# Patient Record
Sex: FEMALE | Race: WHITE | Employment: FULL TIME | URBAN - METROPOLITAN AREA
[De-identification: names, ages, dates, MRNs, and addresses within clinical notes are randomized per-mention and may not be internally consistent; named-entity substitution may affect disease eponyms.]

---

## 2020-08-13 ENCOUNTER — DOCUMENTATION ONLY (OUTPATIENT)
Dept: CASE MANAGEMENT | Age: 55
End: 2020-08-13

## 2020-08-13 NOTE — PROGRESS NOTES
I spoke with Dr [place] office in reference to obtaining a surgical clearance letter for patient to have surgery on 8/18/20. They are aware patient has completed her stress test. The assistant will get Dr Sandra Caballero the message in order for us to get the letter tomorrow. I gave her my contact number and fax number of [phone].

## 2020-08-17 ENCOUNTER — DOCUMENTATION ONLY (OUTPATIENT)
Dept: CASE MANAGEMENT | Age: 55
End: 2020-08-17

## 2020-08-17 ENCOUNTER — ANESTHESIA EVENT (OUTPATIENT)
Dept: SURGERY | Age: 55
End: 2020-08-17
Payer: COMMERCIAL

## 2020-08-17 ENCOUNTER — HOSPITAL ENCOUNTER (OUTPATIENT)
Dept: SURGERY | Age: 55
Discharge: HOME | End: 2020-08-17
Payer: COMMERCIAL

## 2020-08-17 VITALS
OXYGEN SATURATION: 99 % | DIASTOLIC BLOOD PRESSURE: 41 MMHG | RESPIRATION RATE: 20 BRPM | HEART RATE: 75 BPM | BODY MASS INDEX: 48.14 KG/M2 | SYSTOLIC BLOOD PRESSURE: 114 MMHG | WEIGHT: 282 LBS | TEMPERATURE: 97.7 F | HEIGHT: 64 IN

## 2020-08-17 LAB
ANION GAP SERPL CALC-SCNC: 4 MMOL/L (ref 7–16)
BASOPHILS # BLD: 0 K/UL (ref 0–0.2)
BASOPHILS NFR BLD: 0 % (ref 0–2)
BUN SERPL-MCNC: 19 MG/DL (ref 6–23)
CALCIUM SERPL-MCNC: 8.9 MG/DL (ref 8.3–10.4)
CHLORIDE SERPL-SCNC: 106 MMOL/L (ref 98–107)
CO2 SERPL-SCNC: 30 MMOL/L (ref 21–32)
CREAT SERPL-MCNC: 0.93 MG/DL (ref 0.6–1)
DIFFERENTIAL METHOD BLD: ABNORMAL
EOSINOPHIL # BLD: 0.1 K/UL (ref 0–0.8)
EOSINOPHIL NFR BLD: 1 % (ref 0.5–7.8)
ERYTHROCYTE [DISTWIDTH] IN BLOOD BY AUTOMATED COUNT: 14.6 % (ref 11.9–14.6)
GLUCOSE SERPL-MCNC: 90 MG/DL (ref 65–100)
HCT VFR BLD AUTO: 31.2 % (ref 35.8–46.3)
HGB BLD-MCNC: 8.8 G/DL (ref 11.7–15.4)
IMM GRANULOCYTES # BLD AUTO: 0 K/UL (ref 0–0.5)
IMM GRANULOCYTES NFR BLD AUTO: 0 % (ref 0–5)
LYMPHOCYTES # BLD: 2.7 K/UL (ref 0.5–4.6)
LYMPHOCYTES NFR BLD: 28 % (ref 13–44)
MCH RBC QN AUTO: 21.9 PG (ref 26.1–32.9)
MCHC RBC AUTO-ENTMCNC: 28.2 G/DL (ref 31.4–35)
MCV RBC AUTO: 77.6 FL (ref 79.6–97.8)
MONOCYTES # BLD: 0.5 K/UL (ref 0.1–1.3)
MONOCYTES NFR BLD: 5 % (ref 4–12)
NEUTS SEG # BLD: 6.5 K/UL (ref 1.7–8.2)
NEUTS SEG NFR BLD: 66 % (ref 43–78)
NRBC # BLD: 0 K/UL (ref 0–0.2)
PLATELET # BLD AUTO: 302 K/UL (ref 150–450)
PMV BLD AUTO: 10.8 FL (ref 9.4–12.3)
POTASSIUM SERPL-SCNC: 4.8 MMOL/L (ref 3.5–5.1)
RBC # BLD AUTO: 4.02 M/UL (ref 4.05–5.2)
SODIUM SERPL-SCNC: 140 MMOL/L (ref 136–145)
WBC # BLD AUTO: 9.8 K/UL (ref 4.3–11.1)

## 2020-08-17 PROCEDURE — 86900 BLOOD TYPING SEROLOGIC ABO: CPT

## 2020-08-17 PROCEDURE — 85025 COMPLETE CBC W/AUTO DIFF WBC: CPT

## 2020-08-17 PROCEDURE — 80048 BASIC METABOLIC PNL TOTAL CA: CPT

## 2020-08-17 RX ORDER — SODIUM CHLORIDE 9 MG/ML
250 INJECTION, SOLUTION INTRAVENOUS AS NEEDED
Status: DISCONTINUED | OUTPATIENT
Start: 2020-08-17 | End: 2020-08-21 | Stop reason: HOSPADM

## 2020-08-17 NOTE — PERIOP NOTES
Patient verified name and     Order for consent WAS found in EHR and matches case posting; patient verified. Type 2 surgery, WALK IN assessment complete. Labs per surgeon: CBC, BMP, TYPE ANS SCREEN-- COLLECED TODAY AND SENT TO LAB  Labs per anesthesia protocol: HCG DOS  EK20, STRESS 20, CATH 20-- ALSO NOTE OKING SURG FROM DR Irvin Lemus ON CATH REPORT IN CARE EVERYWHERE    A negative Covid swab result is required to proceed with surgery; The testing center is located at the [address], [place]. An appointment is required therefore the patient will be contacted by the Covid swab team. The testing clinic is closed from  for lunch and on weekends. For questions or concerns the patient should call [phone]. Appointment date/time 20--NEG-- RESULTS IN CC found in EHR and provided to patient. Hospital approved surgical skin cleanser and instructions given per hospital policy. Patient provided with and instructed on educational handouts including Guide to Surgery, Pain Management, Hand Hygiene, Blood Transfusion Education, and [facility] Brochure. Patient answered medical/surgical history questions at their best of ability. All prior to admission medications documented in [facility]. Original medication prescription bottle WAS visualized during patient appointment. Patient instructed to hold all vitamins 7 days prior to surgery and NSAIDS 5 days prior to surgery, patient verbalized understanding. Patient teach back successful and patient demonstrates knowledge of instructions.

## 2020-08-17 NOTE — PROGRESS NOTES
I spoke to Dr Cummins Led office in reference to cardiac clearance for surgery tomorrow. I received a fax from Cardiology granting the clearance for surgery. Orders received from Dr Oliva Georges for a BMP today. I called patient and she will have done during pre-op today at the  OP lab. Order faxed to [facility] [phone] confirmation page received.

## 2020-08-17 NOTE — PERIOP NOTES
REVIEWED TODAYS LABS-- NOTED HGB 8.8-- CALLED OFFICE -- SPOKE Kate Worthy--- DR Torrey Benítez IN UNIT-- ORDERED TYPE AND CROSS 2 UNITS AND HOLD--- CALLED LAB-- SPOKE TO HETAL-- STATES JUST PUT ORDER IN AND THEY CAN USE BLOOD THAT WAS DRAWN AT [facility]  TODAY-- ORDER PLACED

## 2020-08-17 NOTE — PERIOP NOTES
Recent Results (from the past 12 hour(s))   TYPE & SCREEN    Collection Time: 08/17/20 10:54 AM   Result Value Ref Range    Crossmatch Expiration 08/20/2020     ABO/Rh(D) O NEGATIVE     Antibody screen NEG    METABOLIC PANEL, BASIC    Collection Time: 08/17/20 11:03 AM   Result Value Ref Range    Sodium 140 136 - 145 mmol/L    Potassium 4.8 3.5 - 5.1 mmol/L    Chloride 106 98 - 107 mmol/L    CO2 30 21 - 32 mmol/L    Anion gap 4 (L) 7 - 16 mmol/L    Glucose 90 65 - 100 mg/dL    BUN 19 6 - 23 MG/DL    Creatinine 0.93 0.6 - 1.0 MG/DL    GFR est AA >60 >60 ml/min/1.73m2    GFR est non-AA >60 >60 ml/min/1.73m2    Calcium 8.9 8.3 - 10.4 MG/DL   CBC WITH AUTOMATED DIFF    Collection Time: 08/17/20 11:03 AM   Result Value Ref Range    WBC 9.8 4.3 - 11.1 K/uL    RBC 4.02 (L) 4.05 - 5.2 M/uL    HGB 8.8 (L) 11.7 - 15.4 g/dL    HCT 31.2 (L) 35.8 - 46.3 %    MCV 77.6 (L) 79.6 - 97.8 FL    MCH 21.9 (L) 26.1 - 32.9 PG    MCHC 28.2 (L) 31.4 - 35.0 g/dL    RDW 14.6 11.9 - 14.6 %    PLATELET 271 039 - 599 K/uL    MPV 10.8 9.4 - 12.3 FL    ABSOLUTE NRBC 0.00 0.0 - 0.2 K/uL    DF AUTOMATED      NEUTROPHILS 66 43 - 78 %    LYMPHOCYTES 28 13 - 44 %    MONOCYTES 5 4.0 - 12.0 %    EOSINOPHILS 1 0.5 - 7.8 %    BASOPHILS 0 0.0 - 2.0 %    IMMATURE GRANULOCYTES 0 0.0 - 5.0 %    ABS. NEUTROPHILS 6.5 1.7 - 8.2 K/UL    ABS. LYMPHOCYTES 2.7 0.5 - 4.6 K/UL    ABS. MONOCYTES 0.5 0.1 - 1.3 K/UL    ABS. EOSINOPHILS 0.1 0.0 - 0.8 K/UL    ABS. BASOPHILS 0.0 0.0 - 0.2 K/UL    ABS. IMM.  GRANS. 0.0 0.0 - 0.5 K/UL   PLEASE NOTE HGB AT [facility] VISIT TODAY

## 2020-08-17 NOTE — PERIOP NOTES
PLEASE CONTINUE TAKING ALL PRESCRIPTION MEDICATIONS UP TO THE DAY OF SURGERY UNLESS OTHERWISE DIRECTED BELOW. DISCONTINUE all vitamins and supplements 7 days prior to surgery. DISCONTINUE Non-Steriodal Anti-Inflammatory (NSAIDS) such as Advil and Aleve 5 days prior to surgery. Home Medications to take  the day of surgery WITH SIP OF WATER    ASA 81 MG, ATENOLOL, PROZAC, OMEPRAZOLE-- AND IF NEEDED ATIVAN AND NORCO           Home Medications   to Hold   MOTRIN        Comments          *Visitor policy of 1 visitor per patient discussed. Please do not bring home medications with you on the day of surgery unless otherwise directed by your nurse. If you are instructed to bring home medications, please give them to your nurse as they will be administered by the nursing staff. If you have any questions, please call [address] [place] [address] [phone] or [address] [place] [address] [phone]. A copy of this note was provided to the patient for reference.

## 2020-08-18 ENCOUNTER — ANESTHESIA (OUTPATIENT)
Dept: SURGERY | Age: 55
End: 2020-08-18
Payer: COMMERCIAL

## 2020-08-18 ENCOUNTER — HOSPITAL ENCOUNTER (OUTPATIENT)
Age: 55
Setting detail: OBSERVATION
Discharge: HOME | End: 2020-08-19
Attending: OBSTETRICS & GYNECOLOGY | Admitting: OBSTETRICS & GYNECOLOGY
Payer: COMMERCIAL

## 2020-08-18 DIAGNOSIS — R52 PAIN: Primary | ICD-10-CM

## 2020-08-18 PROBLEM — Z90.710 S/P TOTAL ABDOMINAL HYSTERECTOMY: Status: ACTIVE | Noted: 2020-08-18

## 2020-08-18 LAB
ABO + RH BLD: NORMAL
BLOOD GROUP ANTIBODIES SERPL: NORMAL
HCG UR QL: NEGATIVE
HCT VFR BLD AUTO: 31.4 % (ref 35.8–46.3)
HGB BLD-MCNC: 8.9 G/DL (ref 11.7–15.4)
SPECIMEN EXP DATE BLD: NORMAL

## 2020-08-18 PROCEDURE — 77030027138 HC INCENT SPIROMETER -A

## 2020-08-18 PROCEDURE — 74011250637 HC RX REV CODE- 250/637: Performed by: ANESTHESIOLOGY

## 2020-08-18 PROCEDURE — 77030019908 HC STETH ESOPH SIMS -A: Performed by: REGISTERED NURSE

## 2020-08-18 PROCEDURE — 77030010507 HC ADH SKN DERMBND J&J -B: Performed by: OBSTETRICS & GYNECOLOGY

## 2020-08-18 PROCEDURE — 74011250636 HC RX REV CODE- 250/636: Performed by: REGISTERED NURSE

## 2020-08-18 PROCEDURE — 74011000254 HC RX REV CODE- 254: Performed by: OBSTETRICS & GYNECOLOGY

## 2020-08-18 PROCEDURE — 77030039425 HC BLD LARYNG TRULITE DISP TELE -A: Performed by: REGISTERED NURSE

## 2020-08-18 PROCEDURE — 74011250636 HC RX REV CODE- 250/636: Performed by: OBSTETRICS & GYNECOLOGY

## 2020-08-18 PROCEDURE — 77030008756 HC TU IRR SUC STRY -B: Performed by: OBSTETRICS & GYNECOLOGY

## 2020-08-18 PROCEDURE — 77030040361 HC SLV COMPR DVT MDII -B: Performed by: OBSTETRICS & GYNECOLOGY

## 2020-08-18 PROCEDURE — 77030003575 HC NDL INSUF ENDOPTH J&J -B: Performed by: OBSTETRICS & GYNECOLOGY

## 2020-08-18 PROCEDURE — 74011250636 HC RX REV CODE- 250/636: Performed by: ANESTHESIOLOGY

## 2020-08-18 PROCEDURE — 76010000877 HC OR TIME 2.5 TO 3HR INTENSV - TIER 2: Performed by: OBSTETRICS & GYNECOLOGY

## 2020-08-18 PROCEDURE — 85018 HEMOGLOBIN: CPT

## 2020-08-18 PROCEDURE — 77030027743 HC APPL F/HEMSTAT BARD -B: Performed by: OBSTETRICS & GYNECOLOGY

## 2020-08-18 PROCEDURE — 77030008522 HC TBNG INSUF LAPRO STRY -B: Performed by: OBSTETRICS & GYNECOLOGY

## 2020-08-18 PROCEDURE — 74011000250 HC RX REV CODE- 250: Performed by: OBSTETRICS & GYNECOLOGY

## 2020-08-18 PROCEDURE — 77030037088 HC TUBE ENDOTRACH ORAL NSL COVD-A: Performed by: REGISTERED NURSE

## 2020-08-18 PROCEDURE — 88307 TISSUE EXAM BY PATHOLOGIST: CPT

## 2020-08-18 PROCEDURE — 77030039147 HC PWDR HEMSTS SURGICEL JNJ -D: Performed by: OBSTETRICS & GYNECOLOGY

## 2020-08-18 PROCEDURE — 77030040361 HC SLV COMPR DVT MDII -B

## 2020-08-18 PROCEDURE — 99218 HC RM OBSERVATION: CPT

## 2020-08-18 PROCEDURE — 77030035277 HC OBTRTR BLDELSS DISP INTU -B: Performed by: OBSTETRICS & GYNECOLOGY

## 2020-08-18 PROCEDURE — 74011000250 HC RX REV CODE- 250: Performed by: REGISTERED NURSE

## 2020-08-18 PROCEDURE — 77030012770 HC TRCR OPT FX AMR -B: Performed by: OBSTETRICS & GYNECOLOGY

## 2020-08-18 PROCEDURE — 77030008606 HC TRCR ENDOSC KII AMR -B: Performed by: OBSTETRICS & GYNECOLOGY

## 2020-08-18 PROCEDURE — 77030031139 HC SUT VCRL2 J&J -A: Performed by: OBSTETRICS & GYNECOLOGY

## 2020-08-18 PROCEDURE — 81025 URINE PREGNANCY TEST: CPT

## 2020-08-18 PROCEDURE — 77030014008 HC SPNG HEMSTAT J&J -C: Performed by: OBSTETRICS & GYNECOLOGY

## 2020-08-18 PROCEDURE — 77030003580 HC NDL INSUF VERES J&J -B: Performed by: OBSTETRICS & GYNECOLOGY

## 2020-08-18 PROCEDURE — 77030034696 HC CATH URETH FOL 2W BARD -A: Performed by: OBSTETRICS & GYNECOLOGY

## 2020-08-18 PROCEDURE — 77030022704 HC SUT VLOC COVD -B: Performed by: OBSTETRICS & GYNECOLOGY

## 2020-08-18 PROCEDURE — 88112 CYTOPATH CELL ENHANCE TECH: CPT

## 2020-08-18 PROCEDURE — 76210000013 HC REC RM PH I  8.5 TO 9 HR: Performed by: OBSTETRICS & GYNECOLOGY

## 2020-08-18 PROCEDURE — 76060000036 HC ANESTHESIA 2.5 TO 3 HR: Performed by: OBSTETRICS & GYNECOLOGY

## 2020-08-18 PROCEDURE — 77030010545: Performed by: OBSTETRICS & GYNECOLOGY

## 2020-08-18 PROCEDURE — 77030040922 HC BLNKT HYPOTHRM STRY -A: Performed by: REGISTERED NURSE

## 2020-08-18 PROCEDURE — 77030016151 HC PROTCTR LNS DFOG COVD -B: Performed by: OBSTETRICS & GYNECOLOGY

## 2020-08-18 PROCEDURE — 77030005518 HC CATH URETH FOL 2W BARD -B: Performed by: OBSTETRICS & GYNECOLOGY

## 2020-08-18 PROCEDURE — 77030003028 HC SUT VCRL J&J -A: Performed by: OBSTETRICS & GYNECOLOGY

## 2020-08-18 PROCEDURE — 77030020703 HC SEAL CANN DISP INTU -B: Performed by: OBSTETRICS & GYNECOLOGY

## 2020-08-18 RX ORDER — FENTANYL CITRATE 50 UG/ML
INJECTION, SOLUTION INTRAMUSCULAR; INTRAVENOUS AS NEEDED
Status: DISCONTINUED | OUTPATIENT
Start: 2020-08-18 | End: 2020-08-18 | Stop reason: HOSPADM

## 2020-08-18 RX ORDER — IBUPROFEN 400 MG/1
400 TABLET ORAL
Status: DISCONTINUED | OUTPATIENT
Start: 2020-08-18 | End: 2020-08-19 | Stop reason: HOSPADM

## 2020-08-18 RX ORDER — GLYCOPYRROLATE 0.2 MG/ML
INJECTION INTRAMUSCULAR; INTRAVENOUS AS NEEDED
Status: DISCONTINUED | OUTPATIENT
Start: 2020-08-18 | End: 2020-08-18 | Stop reason: HOSPADM

## 2020-08-18 RX ORDER — NITROGLYCERIN 0.4 MG/1
0.4 TABLET SUBLINGUAL AS NEEDED
Status: DISCONTINUED | OUTPATIENT
Start: 2020-08-18 | End: 2020-08-19 | Stop reason: HOSPADM

## 2020-08-18 RX ORDER — PANTOPRAZOLE SODIUM 40 MG/1
40 TABLET, DELAYED RELEASE ORAL
Status: DISCONTINUED | OUTPATIENT
Start: 2020-08-19 | End: 2020-08-19 | Stop reason: HOSPADM

## 2020-08-18 RX ORDER — AMOXICILLIN 250 MG
2 CAPSULE ORAL DAILY
Status: DISCONTINUED | OUTPATIENT
Start: 2020-08-19 | End: 2020-08-19 | Stop reason: HOSPADM

## 2020-08-18 RX ORDER — KETAMINE HYDROCHLORIDE 50 MG/ML
INJECTION, SOLUTION INTRAMUSCULAR; INTRAVENOUS AS NEEDED
Status: DISCONTINUED | OUTPATIENT
Start: 2020-08-18 | End: 2020-08-18 | Stop reason: HOSPADM

## 2020-08-18 RX ORDER — OXYCODONE AND ACETAMINOPHEN 5; 325 MG/1; MG/1
1 TABLET ORAL AS NEEDED
Status: DISCONTINUED | OUTPATIENT
Start: 2020-08-18 | End: 2020-08-18 | Stop reason: HOSPADM

## 2020-08-18 RX ORDER — ATENOLOL 25 MG/1
100 TABLET ORAL DAILY
Status: DISCONTINUED | OUTPATIENT
Start: 2020-08-19 | End: 2020-08-19 | Stop reason: HOSPADM

## 2020-08-18 RX ORDER — HEPARIN SODIUM 5000 [USP'U]/ML
5000 INJECTION, SOLUTION INTRAVENOUS; SUBCUTANEOUS ONCE
Status: COMPLETED | OUTPATIENT
Start: 2020-08-18 | End: 2020-08-18

## 2020-08-18 RX ORDER — LIDOCAINE HYDROCHLORIDE 10 MG/ML
0.1 INJECTION INFILTRATION; PERINEURAL AS NEEDED
Status: DISCONTINUED | OUTPATIENT
Start: 2020-08-18 | End: 2020-08-18 | Stop reason: HOSPADM

## 2020-08-18 RX ORDER — ONDANSETRON 4 MG/1
4 TABLET, ORALLY DISINTEGRATING ORAL
Qty: 10 TAB | Refills: 1 | Status: SHIPPED | OUTPATIENT
Start: 2020-08-18

## 2020-08-18 RX ORDER — ASPIRIN 81 MG/1
81 TABLET ORAL DAILY
Status: DISCONTINUED | OUTPATIENT
Start: 2020-08-19 | End: 2020-08-19 | Stop reason: HOSPADM

## 2020-08-18 RX ORDER — CEFAZOLIN SODIUM/WATER 2 G/20 ML
2 SYRINGE (ML) INTRAVENOUS EVERY 8 HOURS
Status: COMPLETED | OUTPATIENT
Start: 2020-08-18 | End: 2020-08-19

## 2020-08-18 RX ORDER — SODIUM CHLORIDE 0.9 % (FLUSH) 0.9 %
5-40 SYRINGE (ML) INJECTION AS NEEDED
Status: DISCONTINUED | OUTPATIENT
Start: 2020-08-18 | End: 2020-08-18 | Stop reason: HOSPADM

## 2020-08-18 RX ORDER — LIDOCAINE HYDROCHLORIDE 20 MG/ML
INJECTION, SOLUTION EPIDURAL; INFILTRATION; INTRACAUDAL; PERINEURAL AS NEEDED
Status: DISCONTINUED | OUTPATIENT
Start: 2020-08-18 | End: 2020-08-18 | Stop reason: HOSPADM

## 2020-08-18 RX ORDER — SODIUM CHLORIDE 0.9 % (FLUSH) 0.9 %
5-40 SYRINGE (ML) INJECTION EVERY 8 HOURS
Status: DISCONTINUED | OUTPATIENT
Start: 2020-08-18 | End: 2020-08-19 | Stop reason: HOSPADM

## 2020-08-18 RX ORDER — APREPITANT 40 MG/1
40 CAPSULE ORAL ONCE
Status: COMPLETED | OUTPATIENT
Start: 2020-08-18 | End: 2020-08-18

## 2020-08-18 RX ORDER — HYDROCODONE BITARTRATE AND ACETAMINOPHEN 10; 325 MG/1; MG/1
1 TABLET ORAL
Status: DISCONTINUED | OUTPATIENT
Start: 2020-08-18 | End: 2020-08-19 | Stop reason: HOSPADM

## 2020-08-18 RX ORDER — PROPOFOL 10 MG/ML
INJECTION, EMULSION INTRAVENOUS AS NEEDED
Status: DISCONTINUED | OUTPATIENT
Start: 2020-08-18 | End: 2020-08-18 | Stop reason: HOSPADM

## 2020-08-18 RX ORDER — PROCHLORPERAZINE MALEATE 10 MG
5 TABLET ORAL
Status: DISCONTINUED | OUTPATIENT
Start: 2020-08-18 | End: 2020-08-19 | Stop reason: HOSPADM

## 2020-08-18 RX ORDER — SODIUM CHLORIDE, SODIUM LACTATE, POTASSIUM CHLORIDE, CALCIUM CHLORIDE 600; 310; 30; 20 MG/100ML; MG/100ML; MG/100ML; MG/100ML
75 INJECTION, SOLUTION INTRAVENOUS CONTINUOUS
Status: DISCONTINUED | OUTPATIENT
Start: 2020-08-18 | End: 2020-08-18 | Stop reason: HOSPADM

## 2020-08-18 RX ORDER — SODIUM CHLORIDE 0.9 % (FLUSH) 0.9 %
5-40 SYRINGE (ML) INJECTION EVERY 8 HOURS
Status: DISCONTINUED | OUTPATIENT
Start: 2020-08-18 | End: 2020-08-18 | Stop reason: HOSPADM

## 2020-08-18 RX ORDER — MIDAZOLAM HYDROCHLORIDE 1 MG/ML
2 INJECTION, SOLUTION INTRAMUSCULAR; INTRAVENOUS
Status: COMPLETED | OUTPATIENT
Start: 2020-08-18 | End: 2020-08-18

## 2020-08-18 RX ORDER — NALOXONE HYDROCHLORIDE 0.4 MG/ML
0.2 INJECTION, SOLUTION INTRAMUSCULAR; INTRAVENOUS; SUBCUTANEOUS AS NEEDED
Status: DISCONTINUED | OUTPATIENT
Start: 2020-08-18 | End: 2020-08-18 | Stop reason: HOSPADM

## 2020-08-18 RX ORDER — SCOLOPAMINE TRANSDERMAL SYSTEM 1 MG/1
1 PATCH, EXTENDED RELEASE TRANSDERMAL ONCE
Status: DISCONTINUED | OUTPATIENT
Start: 2020-08-18 | End: 2020-08-19 | Stop reason: HOSPADM

## 2020-08-18 RX ORDER — SODIUM CHLORIDE, SODIUM LACTATE, POTASSIUM CHLORIDE, CALCIUM CHLORIDE 600; 310; 30; 20 MG/100ML; MG/100ML; MG/100ML; MG/100ML
25 INJECTION, SOLUTION INTRAVENOUS CONTINUOUS
Status: DISCONTINUED | OUTPATIENT
Start: 2020-08-18 | End: 2020-08-18 | Stop reason: HOSPADM

## 2020-08-18 RX ORDER — HYDROCODONE BITARTRATE AND ACETAMINOPHEN 10; 325 MG/1; MG/1
1 TABLET ORAL
Qty: 25 TAB | Refills: 0 | Status: SHIPPED | OUTPATIENT
Start: 2020-08-18 | End: 2020-08-21

## 2020-08-18 RX ORDER — SODIUM CHLORIDE 0.9 % (FLUSH) 0.9 %
5-40 SYRINGE (ML) INJECTION AS NEEDED
Status: DISCONTINUED | OUTPATIENT
Start: 2020-08-18 | End: 2020-08-19 | Stop reason: HOSPADM

## 2020-08-18 RX ORDER — NEOSTIGMINE METHYLSULFATE 1 MG/ML
INJECTION, SOLUTION INTRAVENOUS AS NEEDED
Status: DISCONTINUED | OUTPATIENT
Start: 2020-08-18 | End: 2020-08-18 | Stop reason: HOSPADM

## 2020-08-18 RX ORDER — DEXAMETHASONE SODIUM PHOSPHATE 4 MG/ML
INJECTION, SOLUTION INTRA-ARTICULAR; INTRALESIONAL; INTRAMUSCULAR; INTRAVENOUS; SOFT TISSUE AS NEEDED
Status: DISCONTINUED | OUTPATIENT
Start: 2020-08-18 | End: 2020-08-18 | Stop reason: HOSPADM

## 2020-08-18 RX ORDER — SODIUM CHLORIDE, SODIUM LACTATE, POTASSIUM CHLORIDE, CALCIUM CHLORIDE 600; 310; 30; 20 MG/100ML; MG/100ML; MG/100ML; MG/100ML
75 INJECTION, SOLUTION INTRAVENOUS CONTINUOUS
Status: DISCONTINUED | OUTPATIENT
Start: 2020-08-18 | End: 2020-08-19 | Stop reason: HOSPADM

## 2020-08-18 RX ORDER — DIPHENHYDRAMINE HCL 25 MG
25 CAPSULE ORAL
Status: DISCONTINUED | OUTPATIENT
Start: 2020-08-18 | End: 2020-08-19 | Stop reason: HOSPADM

## 2020-08-18 RX ORDER — AMOXICILLIN 250 MG
1 CAPSULE ORAL DAILY
Qty: 30 TAB | Refills: 1 | Status: SHIPPED | OUTPATIENT
Start: 2020-08-18

## 2020-08-18 RX ORDER — ACETAMINOPHEN 500 MG
1000 TABLET ORAL ONCE
Status: COMPLETED | OUTPATIENT
Start: 2020-08-18 | End: 2020-08-18

## 2020-08-18 RX ORDER — LORAZEPAM 0.5 MG/1
0.5 TABLET ORAL
Status: DISCONTINUED | OUTPATIENT
Start: 2020-08-18 | End: 2020-08-19 | Stop reason: HOSPADM

## 2020-08-18 RX ORDER — BUPIVACAINE HYDROCHLORIDE 5 MG/ML
INJECTION, SOLUTION EPIDURAL; INTRACAUDAL AS NEEDED
Status: DISCONTINUED | OUTPATIENT
Start: 2020-08-18 | End: 2020-08-18 | Stop reason: HOSPADM

## 2020-08-18 RX ORDER — INDOCYANINE GREEN AND WATER 25 MG
KIT INJECTION AS NEEDED
Status: DISCONTINUED | OUTPATIENT
Start: 2020-08-18 | End: 2020-08-18 | Stop reason: HOSPADM

## 2020-08-18 RX ORDER — ONDANSETRON 4 MG/1
4 TABLET, ORALLY DISINTEGRATING ORAL
Status: DISCONTINUED | OUTPATIENT
Start: 2020-08-18 | End: 2020-08-19 | Stop reason: HOSPADM

## 2020-08-18 RX ORDER — NALOXONE HYDROCHLORIDE 0.4 MG/ML
0.4 INJECTION, SOLUTION INTRAMUSCULAR; INTRAVENOUS; SUBCUTANEOUS AS NEEDED
Status: DISCONTINUED | OUTPATIENT
Start: 2020-08-18 | End: 2020-08-19 | Stop reason: HOSPADM

## 2020-08-18 RX ORDER — MIDAZOLAM HYDROCHLORIDE 1 MG/ML
INJECTION, SOLUTION INTRAMUSCULAR; INTRAVENOUS AS NEEDED
Status: DISCONTINUED | OUTPATIENT
Start: 2020-08-18 | End: 2020-08-18 | Stop reason: HOSPADM

## 2020-08-18 RX ORDER — ROCURONIUM BROMIDE 10 MG/ML
INJECTION, SOLUTION INTRAVENOUS AS NEEDED
Status: DISCONTINUED | OUTPATIENT
Start: 2020-08-18 | End: 2020-08-18 | Stop reason: HOSPADM

## 2020-08-18 RX ORDER — KETOROLAC TROMETHAMINE 30 MG/ML
INJECTION, SOLUTION INTRAMUSCULAR; INTRAVENOUS AS NEEDED
Status: DISCONTINUED | OUTPATIENT
Start: 2020-08-18 | End: 2020-08-18 | Stop reason: HOSPADM

## 2020-08-18 RX ORDER — HYDROMORPHONE HYDROCHLORIDE 2 MG/ML
0.5 INJECTION, SOLUTION INTRAMUSCULAR; INTRAVENOUS; SUBCUTANEOUS
Status: DISCONTINUED | OUTPATIENT
Start: 2020-08-18 | End: 2020-08-18 | Stop reason: HOSPADM

## 2020-08-18 RX ORDER — ONDANSETRON 2 MG/ML
INJECTION INTRAMUSCULAR; INTRAVENOUS AS NEEDED
Status: DISCONTINUED | OUTPATIENT
Start: 2020-08-18 | End: 2020-08-18 | Stop reason: HOSPADM

## 2020-08-18 RX ORDER — ONDANSETRON 2 MG/ML
6 INJECTION INTRAMUSCULAR; INTRAVENOUS
Status: DISCONTINUED | OUTPATIENT
Start: 2020-08-18 | End: 2020-08-19 | Stop reason: HOSPADM

## 2020-08-18 RX ADMIN — ROCURONIUM BROMIDE 50 MG: 10 INJECTION, SOLUTION INTRAVENOUS at 08:03

## 2020-08-18 RX ADMIN — ONDANSETRON 4 MG: 2 INJECTION INTRAMUSCULAR; INTRAVENOUS at 10:24

## 2020-08-18 RX ADMIN — HYDROMORPHONE HYDROCHLORIDE 0.5 MG: 2 INJECTION INTRAMUSCULAR; INTRAVENOUS; SUBCUTANEOUS at 11:40

## 2020-08-18 RX ADMIN — CEFAZOLIN SODIUM 2 G: 100 INJECTION, POWDER, LYOPHILIZED, FOR SOLUTION INTRAVENOUS at 22:13

## 2020-08-18 RX ADMIN — APREPITANT 40 MG: 40 CAPSULE ORAL at 07:24

## 2020-08-18 RX ADMIN — PROPOFOL 200 MG: 10 INJECTION, EMULSION INTRAVENOUS at 08:03

## 2020-08-18 RX ADMIN — PHENYLEPHRINE HYDROCHLORIDE 100 MCG: 10 INJECTION INTRAVENOUS at 08:15

## 2020-08-18 RX ADMIN — OXYCODONE HYDROCHLORIDE AND ACETAMINOPHEN 1 TABLET: 5; 325 TABLET ORAL at 17:18

## 2020-08-18 RX ADMIN — HYDROMORPHONE HYDROCHLORIDE 0.5 MG: 2 INJECTION INTRAMUSCULAR; INTRAVENOUS; SUBCUTANEOUS at 11:25

## 2020-08-18 RX ADMIN — FENTANYL CITRATE 25 MCG: 50 INJECTION INTRAMUSCULAR; INTRAVENOUS at 09:22

## 2020-08-18 RX ADMIN — DEXAMETHASONE SODIUM PHOSPHATE 8 MG: 4 INJECTION, SOLUTION INTRAMUSCULAR; INTRAVENOUS at 09:29

## 2020-08-18 RX ADMIN — KETAMINE HYDROCHLORIDE 40 MG: 50 INJECTION INTRAMUSCULAR; INTRAVENOUS at 08:03

## 2020-08-18 RX ADMIN — SODIUM CHLORIDE, SODIUM LACTATE, POTASSIUM CHLORIDE, AND CALCIUM CHLORIDE: 600; 310; 30; 20 INJECTION, SOLUTION INTRAVENOUS at 07:53

## 2020-08-18 RX ADMIN — HYDROMORPHONE HYDROCHLORIDE 0.5 MG: 2 INJECTION INTRAMUSCULAR; INTRAVENOUS; SUBCUTANEOUS at 11:10

## 2020-08-18 RX ADMIN — MIDAZOLAM 2 MG: 1 INJECTION INTRAMUSCULAR; INTRAVENOUS at 07:23

## 2020-08-18 RX ADMIN — SODIUM CHLORIDE, SODIUM LACTATE, POTASSIUM CHLORIDE, AND CALCIUM CHLORIDE 75 ML/HR: 600; 310; 30; 20 INJECTION, SOLUTION INTRAVENOUS at 20:25

## 2020-08-18 RX ADMIN — GLYCOPYRROLATE 0.6 MG: 0.2 INJECTION, SOLUTION INTRAMUSCULAR; INTRAVENOUS at 10:31

## 2020-08-18 RX ADMIN — OXYCODONE HYDROCHLORIDE AND ACETAMINOPHEN 1 TABLET: 5; 325 TABLET ORAL at 12:09

## 2020-08-18 RX ADMIN — HYDROMORPHONE HYDROCHLORIDE 0.5 MG: 2 INJECTION INTRAMUSCULAR; INTRAVENOUS; SUBCUTANEOUS at 11:15

## 2020-08-18 RX ADMIN — KETOROLAC TROMETHAMINE 30 MG: 30 INJECTION, SOLUTION INTRAMUSCULAR; INTRAVENOUS at 10:33

## 2020-08-18 RX ADMIN — LIDOCAINE HYDROCHLORIDE 100 MG: 20 INJECTION, SOLUTION EPIDURAL; INFILTRATION; INTRACAUDAL; PERINEURAL at 08:03

## 2020-08-18 RX ADMIN — PHENYLEPHRINE HYDROCHLORIDE 100 MCG: 10 INJECTION INTRAVENOUS at 08:10

## 2020-08-18 RX ADMIN — FENTANYL CITRATE 100 MCG: 50 INJECTION INTRAMUSCULAR; INTRAVENOUS at 08:03

## 2020-08-18 RX ADMIN — Medication 4 MG: at 10:31

## 2020-08-18 RX ADMIN — CEFAZOLIN 3 G: 1 INJECTION, POWDER, FOR SOLUTION INTRAVENOUS at 08:15

## 2020-08-18 RX ADMIN — ROCURONIUM BROMIDE 20 MG: 10 INJECTION, SOLUTION INTRAVENOUS at 08:36

## 2020-08-18 RX ADMIN — KETAMINE HYDROCHLORIDE 10 MG: 50 INJECTION INTRAMUSCULAR; INTRAVENOUS at 09:55

## 2020-08-18 RX ADMIN — KETAMINE HYDROCHLORIDE 20 MG: 50 INJECTION INTRAMUSCULAR; INTRAVENOUS at 09:00

## 2020-08-18 RX ADMIN — HEPARIN SODIUM 5000 UNITS: 5000 INJECTION INTRAVENOUS; SUBCUTANEOUS at 06:47

## 2020-08-18 RX ADMIN — Medication 10 ML: at 22:14

## 2020-08-18 RX ADMIN — MIDAZOLAM 2 MG: 1 INJECTION INTRAMUSCULAR; INTRAVENOUS at 08:03

## 2020-08-18 RX ADMIN — ROCURONIUM BROMIDE 15 MG: 10 INJECTION, SOLUTION INTRAVENOUS at 09:16

## 2020-08-18 RX ADMIN — ACETAMINOPHEN 1000 MG: 500 TABLET, FILM COATED ORAL at 06:47

## 2020-08-18 NOTE — PERIOP NOTES
Report and transfer of care given to [facility] Text: The defect edges were debeveled with a #15 scalpel blade.  The primary defect was closed partially with a complex linear closure.  Given the location of the defect, shape of the defect and the proximity to free margins a xenograft was deemed most appropriate to repair the remaining defect.  The graft was trimmed to fit the size of the remaining defect.  The graft was then placed in the primary defect, oriented appropriately, and sutured into place.

## 2020-08-18 NOTE — ANESTHESIA PREPROCEDURE EVALUATION
Relevant Problems   No relevant active problems       Anesthetic History     PONV          Review of Systems / Medical History  Patient summary reviewed and pertinent labs reviewed    Pulmonary  Within defined limits                 Neuro/Psych              Cardiovascular    Hypertension: well controlled          CAD, cardiac stents (Stents times three in 2005) and hyperlipidemia    Exercise tolerance: >4 METS  Comments: Recent cath showed moderate ISR of LAD stent, RCA and OM stent patent    Took ASA 81mg this am   GI/Hepatic/Renal     GERD: well controlled           Endo/Other            Comments: Abnormal uterine bleeding due to endometrial hyperplasia  Other Findings            Physical Exam    Airway  Mallampati: II    Neck ROM: normal range of motion   Mouth opening: Normal     Cardiovascular    Rhythm: regular           Dental  No notable dental hx       Pulmonary  Breath sounds clear to auscultation               Abdominal  GI exam deferred       Other Findings            Anesthetic Plan    ASA: 3  Anesthesia type: general            Anesthetic plan and risks discussed with: Patient

## 2020-08-18 NOTE — ANESTHESIA POSTPROCEDURE EVALUATION
Procedure(s): HYSTERECTOMY ROBOTIC ASSISTED BILATERAL SALPINGO OOPHORECTOMY/ BILATERAL SENTINEL LYMPH NODE BIOPSY/ STAGING VAGINAL LACERATION REPAIR. general    Anesthesia Post Evaluation      Multimodal analgesia: multimodal analgesia used between 6 hours prior to anesthesia start to PACU discharge  Patient location during evaluation: PACU  Patient participation: complete - patient participated  Level of consciousness: awake  Pain management: adequate  Airway patency: patent  Anesthetic complications: no  Cardiovascular status: acceptable  Respiratory status: acceptable  Hydration status: acceptable  Post anesthesia nausea and vomiting:  none  Final Post Anesthesia Temperature Assessment:  Normothermia (36.0-37.5 degrees C)      INITIAL Post-op Vital signs:   Vitals Value Taken Time   /54 8/18/2020  2:08 PM   Temp 36.6 °C (97.9 °F) 8/18/2020  1:00 PM   Pulse 78 8/18/2020  2:10 PM   Resp 15 8/18/2020  1:30 PM   SpO2 98 % 8/18/2020  2:10 PM   Vitals shown include unvalidated device data.

## 2020-08-18 NOTE — BRIEF OP NOTE
Brief Postoperative Note    Patient: Nadja Muhammad  YOB: 1965  MRN: [id number]    Date of Procedure: 8/18/2020     Pre-Op Diagnosis: Abnormal uterine bleeding (AUB) [N93.9]    Post-Op Diagnosis: Same as preoperative diagnosis. Vaginal laceration    Procedure(s): HYSTERECTOMY ROBOTIC ASSISTED BILATERAL SALPINGO OOPHORECTOMY/ BILATERAL SENTINEL LYMPH NODE BIOPSY/ STAGING VAGINAL LACERATION REPAIR    Surgeon(s):  Christo Grant MD    Surgical Assistant: None    Anesthesia: General     Estimated Blood Loss (mL): less than 50     Complications: None and Other: vaginal laceration    Specimens:   ID Type Source Tests Collected by Time Destination   1 : Uterus with Bilateral Fallopian tubes and Ovaries Fresh Uterus with Bilateral Fallopian tubes and Ovaries  Christo Grant MD 8/18/2020 1497 Pathology   2 : bilateral sentinel pelvic lymph nodes Preservative Lymph Node  Christo Grant MD 8/18/2020 0900 Pathology   1 : pelvic washings Fresh Pelvis  Christo Grant MD 8/18/2020 3537 Cytology        Implants: * No implants in log *    Drains: * No LDAs found *    Findings: uterus 284 grams  Vaginal laceration with uterus removal, repaired.     Electronically Signed by Jonathan Gutierrez MD on 8/18/2020 at 10:32 AM

## 2020-08-18 NOTE — PERIOP NOTES
Patient verbalized urge to void. PureWick placed by Luz Tapia RN. Patient able to void. Verbalized relief. VSS. Skin care provided. Patient falls back to sleep with eyes closed.

## 2020-08-19 VITALS
SYSTOLIC BLOOD PRESSURE: 113 MMHG | HEIGHT: 64 IN | BODY MASS INDEX: 48.04 KG/M2 | WEIGHT: 281.4 LBS | HEART RATE: 65 BPM | TEMPERATURE: 98.7 F | RESPIRATION RATE: 18 BRPM | OXYGEN SATURATION: 98 % | DIASTOLIC BLOOD PRESSURE: 61 MMHG

## 2020-08-19 LAB
ANION GAP SERPL CALC-SCNC: 7 MMOL/L (ref 7–16)
BASOPHILS # BLD: 0 K/UL (ref 0–0.2)
BASOPHILS NFR BLD: 0 % (ref 0–2)
BUN SERPL-MCNC: 20 MG/DL (ref 6–23)
CALCIUM SERPL-MCNC: 8.1 MG/DL (ref 8.3–10.4)
CHLORIDE SERPL-SCNC: 103 MMOL/L (ref 98–107)
CO2 SERPL-SCNC: 26 MMOL/L (ref 21–32)
CREAT SERPL-MCNC: 1.06 MG/DL (ref 0.6–1)
DIFFERENTIAL METHOD BLD: ABNORMAL
EOSINOPHIL # BLD: 0 K/UL (ref 0–0.8)
EOSINOPHIL NFR BLD: 0 % (ref 0.5–7.8)
ERYTHROCYTE [DISTWIDTH] IN BLOOD BY AUTOMATED COUNT: 14.7 % (ref 11.9–14.6)
GLUCOSE SERPL-MCNC: 139 MG/DL (ref 65–100)
HCT VFR BLD AUTO: 28.5 % (ref 35.8–46.3)
HGB BLD-MCNC: 7.9 G/DL (ref 11.7–15.4)
IMM GRANULOCYTES # BLD AUTO: 0 K/UL (ref 0–0.5)
IMM GRANULOCYTES NFR BLD AUTO: 0 % (ref 0–5)
LYMPHOCYTES # BLD: 1.2 K/UL (ref 0.5–4.6)
LYMPHOCYTES NFR BLD: 8 % (ref 13–44)
MCH RBC QN AUTO: 21.4 PG (ref 26.1–32.9)
MCHC RBC AUTO-ENTMCNC: 27.7 G/DL (ref 31.4–35)
MCV RBC AUTO: 77 FL (ref 79.6–97.8)
MONOCYTES # BLD: 0.6 K/UL (ref 0.1–1.3)
MONOCYTES NFR BLD: 4 % (ref 4–12)
NEUTS SEG # BLD: 13.7 K/UL (ref 1.7–8.2)
NEUTS SEG NFR BLD: 88 % (ref 43–78)
NRBC # BLD: 0 K/UL (ref 0–0.2)
PLATELET # BLD AUTO: 316 K/UL (ref 150–450)
PMV BLD AUTO: 10.9 FL (ref 9.4–12.3)
POTASSIUM SERPL-SCNC: 4.8 MMOL/L (ref 3.5–5.1)
RBC # BLD AUTO: 3.7 M/UL (ref 4.05–5.2)
SODIUM SERPL-SCNC: 136 MMOL/L (ref 136–145)
WBC # BLD AUTO: 15.5 K/UL (ref 4.3–11.1)

## 2020-08-19 PROCEDURE — 74011250636 HC RX REV CODE- 250/636: Performed by: OBSTETRICS & GYNECOLOGY

## 2020-08-19 PROCEDURE — 80048 BASIC METABOLIC PNL TOTAL CA: CPT

## 2020-08-19 PROCEDURE — 74011250637 HC RX REV CODE- 250/637: Performed by: OBSTETRICS & GYNECOLOGY

## 2020-08-19 PROCEDURE — 85025 COMPLETE CBC W/AUTO DIFF WBC: CPT

## 2020-08-19 PROCEDURE — 36415 COLL VENOUS BLD VENIPUNCTURE: CPT

## 2020-08-19 PROCEDURE — 74011000250 HC RX REV CODE- 250: Performed by: OBSTETRICS & GYNECOLOGY

## 2020-08-19 PROCEDURE — 99218 HC RM OBSERVATION: CPT

## 2020-08-19 RX ORDER — CEPHALEXIN 500 MG/1
500 CAPSULE ORAL 4 TIMES DAILY
Qty: 20 CAP | Refills: 0 | Status: SHIPPED | OUTPATIENT
Start: 2020-08-19 | End: 2020-08-24

## 2020-08-19 RX ADMIN — HYDROCODONE BITARTRATE AND ACETAMINOPHEN 1 TABLET: 10; 325 TABLET ORAL at 05:21

## 2020-08-19 RX ADMIN — Medication 10 ML: at 05:24

## 2020-08-19 RX ADMIN — ATENOLOL 100 MG: 25 TABLET ORAL at 08:31

## 2020-08-19 RX ADMIN — HYDROCODONE BITARTRATE AND ACETAMINOPHEN 1 TABLET: 10; 325 TABLET ORAL at 00:29

## 2020-08-19 RX ADMIN — HYDROCODONE BITARTRATE AND ACETAMINOPHEN 1 TABLET: 10; 325 TABLET ORAL at 09:01

## 2020-08-19 RX ADMIN — SENNOSIDES AND DOCUSATE SODIUM 2 TABLET: 8.6; 5 TABLET ORAL at 08:31

## 2020-08-19 RX ADMIN — CEFAZOLIN SODIUM 2 G: 100 INJECTION, POWDER, LYOPHILIZED, FOR SOLUTION INTRAVENOUS at 04:30

## 2020-08-19 RX ADMIN — PANTOPRAZOLE SODIUM 40 MG: 40 TABLET, DELAYED RELEASE ORAL at 05:21

## 2020-08-19 RX ADMIN — ASPIRIN 81 MG: 81 TABLET, COATED ORAL at 08:31

## 2020-08-19 NOTE — PROGRESS NOTES
END OF SHIFT NOTE:    INTAKE/OUTPUT  08/18 0701 - 08/19 0700  In: 3007.3 [P.O.:480; I.V.:2527.3]  Out: 1425 [Urine:1325]  Voiding: YES  Catheter: NO  Drain:              Flatus: Patient does not have flatus present. Stool:  0 occurrences. Characteristics:       Emesis: 0 occurrences. Characteristics:        VITAL SIGNS  Patient Vitals for the past 12 hrs:   Temp Pulse Resp BP SpO2   08/19/20 0319 98 °F (36.7 °C) 75 18  98 %   08/18/20 2350 98.1 °F (36.7 °C) 78 18 104/79 98 %   08/18/20 2050     97 %   08/18/20 2002 98.3 °F (36.8 °C) 73 16 107/72 97 %       Pain Assessment  Pain Intensity 1: 8 (08/19/20 0521)  Pain Location 1: Abdomen, Back  Pain Intervention(s) 1: Medication (see MAR)  Patient Stated Pain Goal: 4    Ambulating  Yes    Shift report to be given to oncoming nurse at the bedside.     [address] [facility]

## 2020-08-19 NOTE — DISCHARGE SUMMARY
Gynecology Surgical Discharge Summary     Name: Jerry Gresham MRN: [id number]  SSN: [id number]    YOB: 1965  Age: 62 y.o. Sex: female      Admit date: 8/18/2020    Discharge Date: 8/19/2020      Attending Physician: Omar Cruz MD     Admission Diagnoses: abnormal imaging uterus    Discharge Diagnoses: Active Problems:    S/P total abdominal hysterectomy (8/18/2020)         Procedures: robotic hysterectomy, bilateral salpingo oophorectomy, sentinal pelvic lymph node identification and removal, vaginal laceration repair    Hospital Course: Normal hospital course for this procedure. Significant Diagnostic Studies:   Recent Results (from the past 24 hour(s))   HGB & HCT    Collection Time: 08/18/20  2:11 PM   Result Value Ref Range    HGB 8.9 (L) 11.7 - 15.4 g/dL    HCT 31.4 (L) 35.8 - 46.3 %   CBC WITH AUTOMATED DIFF    Collection Time: 08/19/20  4:52 AM   Result Value Ref Range    WBC 15.5 (H) 4.3 - 11.1 K/uL    RBC 3.70 (L) 4.05 - 5.2 M/uL    HGB 7.9 (L) 11.7 - 15.4 g/dL    HCT 28.5 (L) 35.8 - 46.3 %    MCV 77.0 (L) 79.6 - 97.8 FL    MCH 21.4 (L) 26.1 - 32.9 PG    MCHC 27.7 (L) 31.4 - 35.0 g/dL    RDW 14.7 (H) 11.9 - 14.6 %    PLATELET 261 891 - 907 K/uL    MPV 10.9 9.4 - 12.3 FL    ABSOLUTE NRBC 0.00 0.0 - 0.2 K/uL    DF AUTOMATED      NEUTROPHILS 88 (H) 43 - 78 %    LYMPHOCYTES 8 (L) 13 - 44 %    MONOCYTES 4 4.0 - 12.0 %    EOSINOPHILS 0 (L) 0.5 - 7.8 %    BASOPHILS 0 0.0 - 2.0 %    IMMATURE GRANULOCYTES 0 0.0 - 5.0 %    ABS. NEUTROPHILS 13.7 (H) 1.7 - 8.2 K/UL    ABS. LYMPHOCYTES 1.2 0.5 - 4.6 K/UL    ABS. MONOCYTES 0.6 0.1 - 1.3 K/UL    ABS. EOSINOPHILS 0.0 0.0 - 0.8 K/UL    ABS. BASOPHILS 0.0 0.0 - 0.2 K/UL    ABS. IMM.  GRANS. 0.0 0.0 - 0.5 K/UL   METABOLIC PANEL, BASIC    Collection Time: 08/19/20  4:52 AM   Result Value Ref Range    Sodium 136 136 - 145 mmol/L    Potassium 4.8 3.5 - 5.1 mmol/L    Chloride 103 98 - 107 mmol/L    CO2 26 21 - 32 mmol/L    Anion gap 7 7 - 16 mmol/L Glucose 139 (H) 65 - 100 mg/dL    BUN 20 6 - 23 MG/DL    Creatinine 1.06 (H) 0.6 - 1.0 MG/DL    GFR est AA >60 >60 ml/min/1.73m2    GFR est non-AA 57 (L) >60 ml/min/1.73m2    Calcium 8.1 (L) 8.3 - 10.4 MG/DL       Patient Instructions:   Current Discharge Medication List      START taking these medications    Details   cephALEXin (KEFLEX) 500 mg capsule Take 1 Cap by mouth four (4) times daily for 5 days. Qty: 20 Cap, Refills: 0      HYDROcodone-acetaminophen (NORCO)  mg tablet Take 1 Tab by mouth every six (6) hours as needed for Pain for up to 3 days. Max Daily Amount: 4 Tabs. Qty: 25 Tab, Refills: 0    Associated Diagnoses: Pain      ondansetron (ZOFRAN ODT) 4 mg disintegrating tablet Take 1 Tab by mouth every eight (8) hours as needed for Nausea or Vomiting. Qty: 10 Tab, Refills: 1      senna-docusate (PERICOLACE) 8.6-50 mg per tablet Take 1 Tab by mouth daily. Qty: 30 Tab, Refills: 1         CONTINUE these medications which have NOT CHANGED    Details   aspirin delayed-release 81 mg tablet Take 81 mg by mouth. atenoloL (TENORMIN) 100 mg tablet Take 100 mg by mouth daily. atorvastatin (LIPITOR) 40 mg tablet Take 40 mg by mouth daily. FLUoxetine (PROzac) 10 mg capsule Take 10 mg by mouth two (2) times a day. LORazepam (ATIVAN) 1 mg tablet Take 1 mg by mouth daily as needed. omeprazole (PRILOSEC) 40 mg capsule Take 40 mg by mouth daily. nitroglycerin (NITROSTAT) 0.4 mg SL tablet 0.4 mg by SubLINGual route. STOP taking these medications       HYDROcodone-acetaminophen (NORCO) 5-325 mg per tablet Comments:   Reason for Stopping:         ibuprofen (MOTRIN) 800 mg tablet Comments:   Reason for Stopping:         ramipriL (ALTACE) 5 mg capsule Comments:   Reason for Stopping:              Activity: No sex, douching, or tampons for 6 weeks or as directed by your physician. No heavy lifting for 6 weeks. No driving while taking pain medication.   Diet: Resume pre-hospital diet  Wound Care: As directed    Follow-up Appointments   Procedures    FOLLOW UP VISIT Appointment in: 3 - 5 Days Friday with  Dr Juventino Smith at 8 am     Friday with  Dr Juventino Smith at 8 am     Standing Status:   Standing     Number of Occurrences:   1     Order Specific Question:   Appointment in     Answer:   3 - 5 Days        Signed By:  Roseanne Paul MD     August 19, 2020

## 2020-08-19 NOTE — DISCHARGE INSTRUCTIONS
Activity: No sex, douching, or tampons for 6 weeks or as directed by your physician. No heavy lifting for 6 weeks. No driving while taking pain medication. Diet: Resume pre-hospital diet  Wound Care: As directed       Abdominal Hysterectomy: What to Expect at [address] [place] [address] Drive can expect to feel better and stronger each day, although you may need pain medicine for a week or two. You may get tired easily or have less energy than usual. This may last for several weeks after surgery. You will probably notice that your belly is swollen and puffy. This is common. The swelling will take several weeks to go down. It may take about 4 to 6 weeks to fully recover. It is important to avoid lifting while you are recovering so that you can heal.  This care sheet gives you a general idea about how long it will take for you to recover. But each person recovers at a different pace. Follow the steps below to get better as quickly as possible. How can you care for yourself at home? Activity  · Rest when you feel tired. Getting enough sleep will help you recover. · Try to walk each day. Start by walking a little more than you did the day before. Bit by bit, increase the amount you walk. Walking boosts blood flow and helps prevent pneumonia and constipation. · Avoid lifting anything that would make you strain. This may include a child, heavy grocery bags and milk containers, a heavy briefcase or backpack, cat litter or dog food bags, or a vacuum . · Avoid strenuous activities, such as biking, jogging, weight lifting, or aerobic exercise, until your doctor says it is okay. · You may shower. Pat the cut (incision) dry. Do not take a bath for the first 2 weeks, or until your doctor tells you it is okay. · Ask your doctor when you can drive again. · You will probably need to take 2 to 4 weeks off from work. It depends on the type of work you do and how you feel.   · Your doctor will tell you when you can have sex again.  Diet  · You can eat your normal diet. If your stomach is upset, try bland, low-fat foods like plain rice, broiled chicken, toast, and yogurt. · Drink plenty of fluids (unless your doctor tells you not to). · You may notice that your bowel movements are not regular right after your surgery. This is common. Try to avoid constipation and straining with bowel movements. You may want to take a fiber supplement every day. If you have not had a bowel movement after a couple of days, ask your doctor about taking a mild laxative. Medicines  · Your doctor will tell you if and when you can restart your medicines. He or she will also give you instructions about taking any new medicines. · If you take aspirin or some other blood thinner, ask your doctor if and when to start taking it again. Make sure that you understand exactly what your doctor wants you to do. · Be safe with medicines. Take pain medicines exactly as directed. ? If the doctor gave you a prescription medicine for pain, take it as prescribed. ? If you are not taking a prescription pain medicine, ask your doctor if you can take an over-the-counter medicine. · If your doctor prescribed antibiotics, take them as directed. Do not stop taking them just because you feel better. You need to take the full course of antibiotics. · If you think your pain medicine is making you sick to your stomach:  ? Take your medicine after meals (unless your doctor has told you not to). ? Ask your doctor for a different pain medicine. Incision care  · If you have strips of tape on the cut (incision) the doctor made, leave the tape on for a week or until it falls off. Or follow your doctor's instructions for removing the tape. · Wash the area daily with warm, soapy water, and pat it dry. Don't use hydrogen peroxide or alcohol, which can slow healing. You may cover the area with a gauze bandage if it weeps or rubs against clothing. Change the bandage every day.   · Keep the area clean and dry. Other instructions  · You may have some light vaginal bleeding. Wear sanitary pads if needed. Do not douche or use tampons. Follow-up care is a key part of your treatment and safety. Be sure to make and go to all appointments, and call your doctor if you are having problems. It's also a good idea to know your test results and keep a list of the medicines you take. When should you call for help? [id number] anytime you think you may need emergency care. For example, call if:  · You passed out (lost consciousness). · You have chest pain, are short of breath, or cough up blood. Call your doctor now or seek immediate medical care if:  · You have pain that does not get better after you take pain medicine. · You cannot pass stools or gas. · You have vaginal discharge that has increased in amount or smells bad. · You are sick to your stomach or cannot drink fluids. · You have loose stitches, or your incision comes open. · Bright red blood has soaked through the bandage over your incision. · You have signs of infection, such as:  ? Increased pain, swelling, warmth, or redness. ? Red streaks leading from the incision. ? Pus draining from the incision. ? A fever. · You have bright red vaginal bleeding that soaks one or more pads in an hour, or you have large clots. · You have signs of a blood clot in your leg (called a deep vein thrombosis), such as:  ? Pain in your calf, back of the knee, thigh, or groin. ? Redness and swelling in your leg. Watch closely for changes in your health, and be sure to contact your doctor if you have any problems. Where can you learn more? Go to http://oj ann-rod.info/  Enter M280 in the search box to learn more about \"Abdominal Hysterectomy: What to Expect at Home. \"  Current as of: November 8, 2019               Content Version: 12.5  © 3004-5476 [employer], [employer].    Care instructions adapted under license by [employer] (which disclaims liability or warranty for this information). If you have questions about a medical condition or this instruction, always ask your healthcare professional. [address] any warranty or liability for your use of this information.

## 2020-08-19 NOTE — PROGRESS NOTES
08/18/20 2002   Dual Skin Pressure Injury Assessment   Dual Skin Pressure Injury Assessment WDL   Second Care Provider (Based on Facility Policy) MICKY Martinez   Skin Integumentary   Skin Integumentary (WDL) X    Pressure  Injury Documentation No Pressure Injury Noted-Pressure Ulcer Prevention Initiated   Skin Integrity Incision (comment)   Skin Color Appropriate for ethnicity; Ecchymosis (comment)  (small bruise in sacral area, pp: \"has been there\")   Skin Condition/Temp Warm;Dry   Turgor Non-tenting   Wound Prevention and Protection Methods   Orientation of Wound Prevention Posterior   Location of Wound Prevention Sacrum/Coccyx   Dressing Present  No   Wound Offloading (Prevention Methods) Bed, pressure reduction mattress     No pressure injuries noted at this time.

## 2020-08-19 NOTE — PROGRESS NOTES
Chart screened by  for discharge planning. No needs identified at this time. Please consult  if any new issues arise. Pt ready for dc home today, no anticipated dc needs. Care Management Interventions  PCP Verified by CM:  Yes  Discharge Durable Medical Equipment: No  Physical Therapy Consult: No  Occupational Therapy Consult: No  Speech Therapy Consult: No  Current Support Network: Lives with Spouse, Own Home  Confirm Follow Up Transport: Family  Discharge Location  Discharge Placement: Home

## 2020-08-19 NOTE — PROGRESS NOTES
Palak Petersen  Admission Date: 8/18/2020               Daily Progress Note: 8/19/2020    LAB  Recent Labs     08/19/20  0452 08/18/20  1411 08/17/20  1103   WBC 15.5*  --  9.8   HGB 7.9* 8.9* 8.8*   HCT 28.5* 31.4* 31.2*     --  302     --  140   K 4.8  --  4.8     --  106   CO2 26  --  30   BUN 20  --  19   CREA 1.06*  --  0.93   *  --  90         Visit Vitals  /81   Pulse 75   Temp 98 °F (36.7 °C)   Resp 18   Ht 5' 4\" (1.626 m)   Wt 281 lb 6.4 oz (127.6 kg)   LMP 08/05/2020   SpO2 98%   BMI 48.30 kg/m²       Current Facility-Administered Medications   Medication Dose Route Frequency Provider Last Rate Last Dose    scopolamine (TRANSDERM-SCOP) 1 mg over 3 days 1 Patch  1 Patch TransDERmal ONCE Rosanna Zaragoza MD   1 Patch at 08/18/20 4131    aspirin delayed-release tablet 81 mg  81 mg Oral DAILY Alexandra Kelly MD        atenoloL (TENORMIN) tablet 100 mg  100 mg Oral DAILY Alexandra Kelly MD        LORazepam (ATIVAN) tablet 0.5 mg  0.5 mg Oral Q6H PRN Alexandra Kelly MD        nitroglycerin (NITROSTAT) tablet 0.4 mg  0.4 mg SubLINGual PRN Alexandra Kelly MD        pantoprazole (PROTONIX) tablet 40 mg  40 mg Oral ACB Alexandra Kelly MD   40 mg at 08/19/20 0521    sodium chloride (NS) flush 5-40 mL  5-40 mL IntraVENous Q8H Alexandra Kelly MD   10 mL at 08/19/20 0524    sodium chloride (NS) flush 5-40 mL  5-40 mL IntraVENous PRN Alexandra Kelly MD        lactated Ringers infusion  75 mL/hr IntraVENous CONTINUOUS Alexandra Kelly MD 75 mL/hr at 08/18/20 2025 75 mL/hr at 08/18/20 2025    ibuprofen (MOTRIN) tablet 400 mg  400 mg Oral Q6H PRN Alexandra Kelly MD        HYDROcodone-acetaminophen [facility])  mg tablet 1 Tab  1 Tab Oral Q4H PRN Alexandra Kelly MD   1 Tab at 08/19/20 0521    naloxone [facility]) injection 0.4 mg  0.4 mg IntraVENous PRN Alexandra Kelly MD        ondansetron (ZOFRAN ODT) tablet 4 mg  4 mg Oral Q4H PRN Alexandra Kelly MD        diphenhydrAMINE (BENADRYL) capsule 25 mg  25 mg Oral Q4H PRN Irene Acosta MD        senna-docusate (PERICOLACE) 8.6-50 mg per tablet 2 Tab  2 Tab Oral DAILY Irene Acosta MD        prochlorperazine (COMPAZINE) tablet 5 mg  5 mg Oral Q6H PRN Irene Acosta MD        ondansetron [facility] injection 6 mg  6 mg IntraVENous Q6H PRN Irene Acosta MD         Facility-Administered Medications Ordered in Other Encounters   Medication Dose Route Frequency Provider Last Rate Last Dose    0.9% sodium chloride infusion 250 mL  250 mL IntraVENous PRN Katherine Mas MD           No Known Allergies    NOTE  No issues overnight  aox3 nad  rrr  ctab  abd soft  incs cdi  Pod 1  dc    Ori Crouch MD  8/19/2020

## 2020-08-19 NOTE — OP NOTES
[facility]  OPERATIVE REPORT    Name:  Rachele Rhodes  MR#:  [id number]  :  1965  ACCOUNT #:  [de-identified]  DATE OF SERVICE:  2020    PREOPERATIVE DIAGNOSES:  1. Abnormal imaging of uterus with postmenopausal bleeding, persistent, and history of an ablation. 2.  History of hyperplasia, endometrial.  3.  Coronary artery disease, morbid obesity, and history of chronic anticoagulation discontinued by Cardiology. POSTOPERATIVE DIAGNOSIS:  Same and vaginal laceration    PROCEDURES PERFORMED:  1. Laparoscopic-assisted robotic hysterectomy, bilateral salpingo-oophorectomy for uterus greater than 250 g.  2.  Nonradioactive sentinel lymph node identification dye, retroperitoneal.  3.  Retroperitoneal lymph node sampling, laparoscopic robotic, bilateral.  4.  Vaginal laceration repair. SURGEON:  David Diez MD      ANESTHESIA:  General.    COMPLICATIONS:  None. SPECIMENS REMOVED:  Pathology:  Above and washings. ESTIMATED BLOOD LOSS:  50 mL. PACKS:  None. DRAINS:  Transurethral Larson catheter, discontinued at completion. DISPOSITION:  PACU. INDICATIONS:  This is a patient who had had a history of a prior biopsy showing hyperplasia with a short course of progestin therapy followed by an endometrial ablation. She had a history of persistent endometrial postmenopausal bleeding over years with also a history of anticoagulation in the past through her cardiologist.  She wished to proceed with definitive surgery. The risks, benefits, alternatives, morbidities, and mortalities were reviewed extensively. She also had cardiac clearance prior to surgery including catheterization. FINDINGS:  Upper abdomen normal.  Abdomen normal.  Bilateral sentinel pelvic lymph nodes were identified and removed. Ureters normal pre and post procedure. The uterus was enlarged. Weight was 284 g in the operating room.   Adnexa appeared relatively normal.  After removal of the uterus, vaginal laceration x2 were noted. These were both closed primarily superficially with Vicryl and Fibrillar placed. PROCEDURE:  The patient was taken to the operating room and placed under general anesthesia, sterilely prepped and draped. A uterine manipulator was placed without difficulty after the cervix was injected at 3 and 9 o'clock with fluorescein green dye. A Larson catheter had been placed previously. Attention was turned to the abdomen. A supraumbilical incision was made. A Veress needle was introduced. In low patient pressures, reassuring drop test was noted. Visiport placed. Findings were as above. Further trocars, two left, two right, were placed under direct visualization and the robot was subsequently docked after washings were taken. The retroperitoneal spaces were opened and the sentinel lymph nodes were identified using a fluorescent camera. They were subsequently removed, preserving neurovascular and urologic structures throughout. Hemostasis was maintained with Bovie. Fibrillar was placed in the dissection sites. Window made between ureters and IP. The IPs were grasped with bipolar, cauterized and transected. The anterior cul-de-sac was incised. A bladder flap was created sharply at the level of the external cervical manipulator. The uterine vessels were skeletonized bilaterally. They were grasped with bipolar, cauterized and transected. This was continued to the level of the external cervical manipulator. Anterior colpotomy was made. Circumferential incision was made. The specimen was released and subsequently removed vaginally intact. The vaginal apex was left open initially, closing the vaginal mucosa with a pursestring self-locking locking suture. It was then reapproximated in the midline. The peritoneum was oversewn over the cuff. Irrigation was performed. Hemostasis was confirmed at all operative sites.   Fibrillar followed by Surgicel was placed over the denuded pelvis. The robot was undocked. The left lateral trocar site was closed under direct visualization using a laparoscopic closure device. The remainder of the trocars were removed after insufflation was allowed to resolve. Subcuticulars and Dermabond placed. Attention was turned vaginally. The areas noted were disrupted superficially with some bleeding noted. They were subsequently closed with Vicryl. There was no further bleeding noted. Fibrillar was placed over the two sites. The Larson catheter was removed. Sponge, lap, and needle counts were correct x2. The patient tolerated the procedure well and was taken to PACU stable per Anesthesia.       MD SILVIA [email or web address]  D:  08/19/2020 10:22  T:  08/19/2020 17:50  JOB #:  [id number]

## 2020-08-21 PROBLEM — E66.01 OBESITY, MORBID (HCC): Status: ACTIVE | Noted: 2020-08-21

## 2020-08-27 ENCOUNTER — TELEPHONE (OUTPATIENT)
Dept: CASE MANAGEMENT | Age: 55
End: 2020-08-27

## 2020-08-27 NOTE — TELEPHONE ENCOUNTER
Ms. Brien Hooks called stating she has some abdominal cramping. She stated she over ate yesterday because it was the first day her taste buds had returned. She denies nausea. When asked she did mention possible constipation. We reviewed OTC medications like stool softeners. I educated her as she begins to eat more solid foods to ensure she is taking a stool softeners. She asked if there were any foods to avoid I instructed her to limit how many new foods she adds into her diet in 1 day and reminded her fried foods and spicy foods may upset her stomach. She states understanding. I told her if the cramping became worse like in frequency, how long it lasts, or if it becomes different like more painful to please call us back. She denies fevers, redness or drainage from the surgery sites. I reminded her of her 9/4 appt and to use stool softeners.

## 2020-08-28 ENCOUNTER — TELEPHONE (OUTPATIENT)
Dept: CASE MANAGEMENT | Age: 55
End: 2020-08-28

## 2020-08-28 NOTE — TELEPHONE ENCOUNTER
Patient called and left  about constipation and wanting to know if she can take Linzess since stool softener not working. I spoke to Warner Wiseman NP and then returned patient call. Oleksandr Muñoz is aware that we recommend Mirlax first however if she had used Linzess in the past it is fine to use. I instructed her to try Mirlax first and if still no results she could try her Linzess. She stated understanding.  She is aware to call us if needed prior to 9/4 appointment,

## (undated) DEVICE — MONOPOLAR CAUTERY CORD

## (undated) DEVICE — ROBOTIC PROSTATE: Brand: MEDLINE INDUSTRIES, INC.

## (undated) DEVICE — DERMABOND SKIN ADH 0.7ML -- DERMABOND ADVANCED 12/BX

## (undated) DEVICE — BIPOLAR CAUTERY CORD

## (undated) DEVICE — CATHETERIZATION KIT PEDIATRIC 18 FR 5 CC FOL TEMP SENS

## (undated) DEVICE — PROGRASP FORCEPS: Brand: ENDOWRIST

## (undated) DEVICE — SUTURE DEV SZ 2-0 WND CLSR ABSRB GS-22 VLOC COVIDIEN VLOCM2145

## (undated) DEVICE — COLUMN DRAPE

## (undated) DEVICE — BLADE SURG NO15 S STL STR DISP GLASSVAN

## (undated) DEVICE — SOLUTION IRRIG 1000ML H2O STRL BLT

## (undated) DEVICE — NEEDLE INSUF L150MM DIA2MM DISP FOR PNEUMOPERI ENDOPATH

## (undated) DEVICE — [HIGH FLOW INSUFFLATOR,  DO NOT USE IF PACKAGE IS DAMAGED,  KEEP DRY,  KEEP AWAY FROM SUNLIGHT,  PROTECT FROM HEAT AND RADIOACTIVE SOURCES.]: Brand: PNEUMOSURE

## (undated) DEVICE — DRAPE,UNDERBUTTOCKS,PCH,STERILE: Brand: MEDLINE

## (undated) DEVICE — CARDINAL HEALTH FLEXIBLE LIGHT HANDLE COVER: Brand: CARDINAL HEALTH

## (undated) DEVICE — COVER,TABLE,44X76,STERILE: Brand: MEDLINE

## (undated) DEVICE — TROCAR: Brand: KII® OPTICAL ACCESS SYSTEM

## (undated) DEVICE — FENESTRATED BIPOLAR FORCEPS: Brand: ENDOWRIST

## (undated) DEVICE — SEAL UNIV 5-8MM DISP BX/10 -- DA VINCI XI - SNGL USE

## (undated) DEVICE — AGENT HEMSTAT W4XL4IN OXIDIZED REGENERATED CELOS ABSRB SFT

## (undated) DEVICE — STANDARD HYPODERMIC NEEDLE,POLYPROPYLENE HUB: Brand: MONOJECT

## (undated) DEVICE — TIP COVER ACCESSORY

## (undated) DEVICE — (D)PREP SKN CHLRAPRP APPL 26ML -- CONVERT TO ITEM 371833

## (undated) DEVICE — POWDER HEMOSTAT GEL 3.0GR -- SURGICEL

## (undated) DEVICE — TROCAR: Brand: KII FIOS FIRST ENTRY

## (undated) DEVICE — SHEET,DRAPE,UNDERBUTTOCK,GRAD POUCH,PORT: Brand: MEDLINE

## (undated) DEVICE — SCISSORS SURG DIA8MM MPLR CRV ENDOWRIST

## (undated) DEVICE — JELLY,LUBE,STERILE,FLIP TOP,TUBE,4-OZ: Brand: MEDLINE

## (undated) DEVICE — APPLICATOR FBR TIP L6IN COT TIP WOOD SHFT SWAB 2000 PER CA

## (undated) DEVICE — DRAPE,TOP,102X53,STERILE: Brand: MEDLINE

## (undated) DEVICE — MEGA SUTURECUT ND: Brand: ENDOWRIST

## (undated) DEVICE — VISUALIZATION SYSTEM: Brand: CLEARIFY

## (undated) DEVICE — BAG DRNGE 4000ML CONT IRRIG ROUNDED TEARDROP SHP DISP

## (undated) DEVICE — SPONGE LAP 18X18IN STRL -- 5/PK

## (undated) DEVICE — APPLICATOR SURG XL L38CM FOR ARISTA ABSRB HEMSTAT FLEXITIP

## (undated) DEVICE — 2, DISPOSABLE SUCTION/IRRIGATOR WITHOUT DISPOSABLE TIP: Brand: STRYKEFLOW

## (undated) DEVICE — GAUZE,SPONGE,4"X4",8PLY,STRL,LF,10/TRAY: Brand: MEDLINE

## (undated) DEVICE — LAPAROSCOPIC TROCAR SLEEVE/SINGLE USE: Brand: KII® OPTICAL ACCESS SYSTEM

## (undated) DEVICE — JELLY LUBRICATING 10GM PREFIL SYR LUBE

## (undated) DEVICE — TRAY PREP DRY W/ PREM GLV 2 APPL 6 SPNG 2 UNDPD 1 OVERWRAP

## (undated) DEVICE — DRAPE TWL SURG 16X26IN BLU ORB04] ALLCARE INC]

## (undated) DEVICE — ELECTRO LUBE IS A SINGLE PATIENT USE DEVICE THAT IS INTENDED TO BE USED ON ELECTROSURGICAL ELECTRODES TO REDUCE STICKING.: Brand: KEY SURGICAL ELECTRO LUBE

## (undated) DEVICE — LARGE NEEDLE DRIVER: Brand: ENDOWRIST

## (undated) DEVICE — SUTURE VCRL SZ 3-0 L27IN ABSRB UD L26MM SH 1/2 CIR J416H

## (undated) DEVICE — GARMENT,MEDLINE,DVT,INT,CALF,MED, GEN2: Brand: MEDLINE

## (undated) DEVICE — SUTURE VCRL + SZ 4-0 L27IN ABSRB UD PS-2 3/8 CIR REV CUT VCP426H

## (undated) DEVICE — BASIC SINGLE BASIN-LF: Brand: MEDLINE INDUSTRIES, INC.

## (undated) DEVICE — REM POLYHESIVE ADULT PATIENT RETURN ELECTRODE: Brand: VALLEYLAB

## (undated) DEVICE — LEGGINGS, PAIR, 31X48, STERILE: Brand: MEDLINE

## (undated) DEVICE — SYR 10ML LUER LOK 1/5ML GRAD --

## (undated) DEVICE — SYR LR LCK 1ML GRAD NSAF 30ML --

## (undated) DEVICE — DRAPE,LAP,CHOLE,W/TROUGHS,STERILE: Brand: MEDLINE

## (undated) DEVICE — CONTAINER SPEC HISTOLOGY 900ML POLYPR

## (undated) DEVICE — BLADELESS OBTURATOR: Brand: WECK VISTA

## (undated) DEVICE — ARM DRAPE

## (undated) DEVICE — 40585 XL ADVANCED TRENDELENBURG POSITIONING KIT: Brand: 40585 XL ADVANCED TRENDELENBURG POSITIONING KIT

## (undated) DEVICE — SUTURE VCRL SZ 0 L36IN ABSRB UD L36MM CT-1 1/2 CIR J946H

## (undated) DEVICE — CATHETER F BLLN 5CC 18FR 2 W HYDRGEL COAT LESS TRAUM LUB

## (undated) DEVICE — NEEDLE INSUF L120MM ULT VERES ENDOPATH

## (undated) DEVICE — GOWN,REINF,POLY,ECL,PP SLV,XL: Brand: MEDLINE